# Patient Record
Sex: FEMALE | Race: WHITE | Employment: FULL TIME | URBAN - NONMETROPOLITAN AREA
[De-identification: names, ages, dates, MRNs, and addresses within clinical notes are randomized per-mention and may not be internally consistent; named-entity substitution may affect disease eponyms.]

---
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
## 2022-01-16 ENCOUNTER — HOSPITAL ENCOUNTER (EMERGENCY)
Age: 22
Discharge: HOME | End: 2022-01-16
Attending: FAMILY MEDICINE
Payer: COMMERCIAL

## 2022-01-16 VITALS
OXYGEN SATURATION: 95 % | WEIGHT: 140 LBS | DIASTOLIC BLOOD PRESSURE: 65 MMHG | TEMPERATURE: 96.7 F | SYSTOLIC BLOOD PRESSURE: 107 MMHG | HEART RATE: 82 BPM | RESPIRATION RATE: 15 BRPM | BODY MASS INDEX: 23.32 KG/M2 | HEIGHT: 65 IN

## 2022-01-16 DIAGNOSIS — H69.81 DYSFUNCTION OF RIGHT EUSTACHIAN TUBE: Primary | ICD-10-CM

## 2022-01-16 LAB
GROUP A STREP CULTURE, REFLEX: NEGATIVE
REFLEX THROAT C + S: NORMAL

## 2022-01-16 PROCEDURE — 87070 CULTURE OTHR SPECIMN AEROBIC: CPT

## 2022-01-16 PROCEDURE — 87880 STREP A ASSAY W/OPTIC: CPT

## 2022-01-16 PROCEDURE — 99283 EMERGENCY DEPT VISIT LOW MDM: CPT

## 2022-01-16 RX ORDER — PREDNISONE 10 MG/1
TABLET ORAL
Qty: 20 TABLET | Refills: 0 | Status: SHIPPED | OUTPATIENT
Start: 2022-01-16 | End: 2022-01-26

## 2022-01-16 RX ORDER — BENZOCAINE/MENTHOL 6 MG-10 MG
1 LOZENGE MUCOUS MEMBRANE
Qty: 18 LOZENGE | Refills: 0 | Status: SHIPPED | OUTPATIENT
Start: 2022-01-16 | End: 2022-01-23

## 2022-01-16 ASSESSMENT — ENCOUNTER SYMPTOMS
SHORTNESS OF BREATH: 0
SORE THROAT: 1
VOMITING: 0
COUGH: 0
EYE REDNESS: 0
NAUSEA: 0
SINUS PRESSURE: 0
SINUS PAIN: 0
EYE DISCHARGE: 0

## 2022-01-16 ASSESSMENT — PAIN DESCRIPTION - LOCATION: LOCATION: THROAT

## 2022-01-16 ASSESSMENT — PAIN SCALES - GENERAL
PAINLEVEL_OUTOF10: 4
PAINLEVEL_OUTOF10: 6

## 2022-01-17 NOTE — ED PROVIDER NOTES
[facility]  eMERGENCY dEPARTMENT eNCOUnter          CHIEF COMPLAINT       Chief Complaint   Patient presents with    Pharyngitis       Nurses Notes reviewed and I agree except as noted in the HPI. HISTORY OF PRESENT ILLNESS    Marysol Salas is a 25 y.o. female who presents with sore throat and right ear pain. patient notes being seen by PCP one week ago. At that time she was started on Z pack. She notes no improvement. Denies fever,chills. Denies body rash. Notes right ear pain when swallowing. REVIEW OF SYSTEMS     Review of Systems   Constitutional: Negative for chills and fever. HENT: Positive for ear pain and sore throat. Negative for congestion, sinus pressure and sinus pain. Eyes: Negative for discharge and redness. Respiratory: Negative for cough and shortness of breath. Gastrointestinal: Negative for nausea and vomiting. All other systems reviewed and are negative. PAST MEDICAL HISTORY    has a past medical history of Anemia. SURGICAL HISTORY      has a past surgical history that includes other surgical history (Right, 10/26/2016). CURRENT MEDICATIONS       Discharge Medication List as of 1/16/2022  8:17 PM      CONTINUE these medications which have NOT CHANGED    Details   fluticasone (FLONASE) 50 MCG/ACT nasal spray 2 sprays by Each Nostril route daily, Disp-16 g, R-0Normal      norgestimate-ethinyl estradiol (SPRINTEC 28) 0.25-35 MG-MCG per tablet Take 1 tablet by mouth dailyHistorical Med      acetaminophen (TYLENOL) 500 MG tablet Take 500 mg by mouth every 6 hours as needed for PainHistorical Med      ibuprofen (ADVIL;MOTRIN) 200 MG tablet Take 200 mg by mouth every 6 hours as needed for PainHistorical Med             ALLERGIES     has No Known Allergies. FAMILY HISTORY     She indicated that her mother is alive. She indicated that her father is alive.  She indicated that the status of her neg hx is unknown.   family history is not on file.    SOCIAL HISTORY      reports that she has never smoked. She has never used smokeless tobacco. She reports current alcohol use. She reports that she does not use drugs. PHYSICAL EXAM     INITIAL VITALS:  height is 5' 5\" (1.651 m) and weight is 140 lb (63.5 kg). Her temperature is 96.7 °F (35.9 °C) (abnormal). Her blood pressure is 107/65 and her pulse is 82. Her respiration is 15 and oxygen saturation is 95%. Physical Exam  Vitals and nursing note reviewed. Constitutional:       General: She is not in acute distress. HENT:      Right Ear: No drainage or tenderness. A middle ear effusion is present. Nose: No congestion. Mouth/Throat:      Mouth: Mucous membranes are dry. No oral lesions. Pharynx: No pharyngeal swelling, oropharyngeal exudate or posterior oropharyngeal erythema. Tonsils: No tonsillar exudate or tonsillar abscesses. Eyes:      Conjunctiva/sclera: Conjunctivae normal.      Pupils: Pupils are equal, round, and reactive to light. Cardiovascular:      Rate and Rhythm: Normal rate and regular rhythm. Pulmonary:      Effort: Pulmonary effort is normal.      Breath sounds: Normal breath sounds. Musculoskeletal:      Cervical back: Neck supple. Lymphadenopathy:      Cervical: No cervical adenopathy. Neurological:      Mental Status: She is alert.          DIFFERENTIAL DIAGNOSIS:   eustachian tube dysfunction,pharyngitis,    DIAGNOSTIC RESULTS     EKG: All EKG's are interpreted by the Emergency Department Physician who either signs or Co-signs this chart in the absence of a cardiologist.      RADIOLOGY: non-plain film images(s) such as CT, Ultrasound and MRI are read by the radiologist.      LABS:   Labs Reviewed   CULTURE, THROAT    Narrative:     Source: Specimen not received       Site:           Current Antibiotics:   GROUP A STREP, REFLEX       EMERGENCY DEPARTMENT COURSE:   Vitals:    Vitals:    01/16/22 1949   BP: 107/65   Pulse: 82   Resp: 15   Temp: (!) 96.7 °F (35.9 °C)   SpO2: 95%   Weight: 140 lb (63.5 kg)   Height: 5' 5\" (1.651 m)     On exam. Posterior pharynx is non erythematous. No exudates are noted. No tonsillar swelling. Right ear bulging. No TM erythema noted. No mastoid tenderness or pain. Will provide throat lozenges and steroids. Will see if symptoms improved. Recommend follow up with PCP if symptoms not improving over next week. PROCEDURES:  None    FINAL IMPRESSION      1. Dysfunction of right eustachian tube          DISPOSITION/PLAN   Home. Care instructions provided. Follow up with PCP if no improvement in symptoms.      PATIENT REFERRED TO:  Alissa Casey, APRN - CNP  [address] [facility]  [address] [facility]  [place] [address]  [phone]    Call in 3 days  If symptoms worsen, As needed      DISCHARGE MEDICATIONS:  Discharge Medication List as of 1/16/2022  8:17 PM      START taking these medications    Details   predniSONE (DELTASONE) 10 MG tablet Take 4 tablets by mouth once daily for 5 days, Disp-20 tablet, R-0Normal      Benzocaine-Menthol (SORE THROAT LOZENGES) 6-10 MG LOZG lozenge Take 1 lozenge by mouth every 2 hours as needed for Sore Throat or Pain, Disp-18 lozenge, R-0Normal             (Please note that portions of this note were completed with a voice recognition program.  Efforts were made to edit the dictations but occasionally words are mis-transcribed.)    Rashard Modi MD    ,Sharyl Carrel, MD  01/16/22 2046

## 2022-01-17 NOTE — ED NOTES
Pt presents to the front window with complaints of sore throat for 14 days. Was on Zithromax but that did not help.      Ernesto Carrington RN  01/16/22 2325

## 2022-01-19 LAB — THROAT/NOSE CULTURE: NORMAL

## 2023-05-25 NOTE — ED TRIAGE NOTES
Pt comes in through [facility]. This morning she began having some blurry vision. She does wear contacts. She took them out to see if it would improve and it did not. Since then she has put them back in but is still have some blurry vision. A few hours ago she began having a posterior headache and neck pain. None

## 2023-09-26 ENCOUNTER — NURSE ONLY (OUTPATIENT)
Dept: LAB | Age: 23
End: 2023-09-26

## 2023-10-02 LAB — CYTOLOGY THIN PREP PAP: NORMAL
